# Patient Record
Sex: FEMALE | Race: WHITE | NOT HISPANIC OR LATINO | Employment: FULL TIME | URBAN - METROPOLITAN AREA
[De-identification: names, ages, dates, MRNs, and addresses within clinical notes are randomized per-mention and may not be internally consistent; named-entity substitution may affect disease eponyms.]

---

## 2023-10-27 ENCOUNTER — OFFICE VISIT (OUTPATIENT)
Dept: PRIMARY CARE | Facility: CLINIC | Age: 33
End: 2023-10-27
Payer: COMMERCIAL

## 2023-10-27 VITALS
WEIGHT: 143 LBS | BODY MASS INDEX: 21.74 KG/M2 | SYSTOLIC BLOOD PRESSURE: 115 MMHG | DIASTOLIC BLOOD PRESSURE: 82 MMHG | OXYGEN SATURATION: 97 % | HEART RATE: 95 BPM

## 2023-10-27 DIAGNOSIS — R55 NEAR SYNCOPE: Primary | ICD-10-CM

## 2023-10-27 PROBLEM — H69.93 DYSFUNCTION OF BOTH EUSTACHIAN TUBES: Status: ACTIVE | Noted: 2023-10-27

## 2023-10-27 PROBLEM — M77.8 TENDONITIS OF WRIST, LEFT: Status: ACTIVE | Noted: 2023-10-27

## 2023-10-27 PROBLEM — R41.840 ATTENTION AND CONCENTRATION DEFICIT: Status: ACTIVE | Noted: 2023-10-27

## 2023-10-27 PROCEDURE — 1036F TOBACCO NON-USER: CPT | Performed by: FAMILY MEDICINE

## 2023-10-27 PROCEDURE — 99214 OFFICE O/P EST MOD 30 MIN: CPT | Performed by: FAMILY MEDICINE

## 2023-10-27 RX ORDER — BISMUTH SUBSALICYLATE 262 MG
1 TABLET,CHEWABLE ORAL DAILY
COMMUNITY

## 2023-10-27 ASSESSMENT — ENCOUNTER SYMPTOMS
CONSTIPATION: 0
UNEXPECTED WEIGHT CHANGE: 0
FATIGUE: 0
NERVOUS/ANXIOUS: 1
DYSPHORIC MOOD: 0
SHORTNESS OF BREATH: 0
LIGHT-HEADEDNESS: 1
DIARRHEA: 0
DIZZINESS: 1
PALPITATIONS: 1
HEADACHES: 0
DIFFICULTY URINATING: 0
SLEEP DISTURBANCE: 0

## 2023-10-27 ASSESSMENT — PATIENT HEALTH QUESTIONNAIRE - PHQ9
2. FEELING DOWN, DEPRESSED OR HOPELESS: NOT AT ALL
SUM OF ALL RESPONSES TO PHQ9 QUESTIONS 1 AND 2: 0
1. LITTLE INTEREST OR PLEASURE IN DOING THINGS: NOT AT ALL

## 2023-10-27 NOTE — PROGRESS NOTES
Subjective   Patient ID: Cindy Chirinos is a 33 y.o. female who presents for Dizziness (Feeling faint last 2 months).  Dizziness  Pertinent negatives include no chest pain, fatigue or headaches.     Cindy presents for discussion of episodes of light-headedness/faint that she has noted over last few months.  Initially had headache, which improved with increase in fluids.  She does not feel this is triggered by anything, including cycles.  In between these episodes she feels fine.  Review of Systems   Constitutional:  Negative for fatigue and unexpected weight change.   Respiratory:  Negative for shortness of breath.    Cardiovascular:  Positive for palpitations. Negative for chest pain.   Gastrointestinal:  Negative for constipation and diarrhea.   Genitourinary:  Negative for difficulty urinating.   Neurological:  Positive for dizziness, syncope (near syncope) and light-headedness. Negative for headaches.   Psychiatric/Behavioral:  Negative for dysphoric mood and sleep disturbance. The patient is nervous/anxious (occasional).        Objective   Vitals:    10/27/23 1107   BP: 115/82   Pulse: 95   SpO2: 97%   Weight: 64.9 kg (143 lb)      Physical Exam  Constitutional:       Appearance: Normal appearance.   HENT:      Head: Normocephalic and atraumatic.   Eyes:      Extraocular Movements: Extraocular movements intact.      Pupils: Pupils are equal, round, and reactive to light.   Neck:      Thyroid: No thyroid mass or thyromegaly.   Cardiovascular:      Rate and Rhythm: Normal rate and regular rhythm.   Pulmonary:      Effort: Pulmonary effort is normal.      Breath sounds: Normal breath sounds.   Abdominal:      General: There is no distension.      Tenderness: There is no abdominal tenderness.   Musculoskeletal:      Cervical back: Normal range of motion and neck supple.   Lymphadenopathy:      Cervical: No cervical adenopathy.   Neurological:      General: No focal deficit present.      Mental Status: She is  alert and oriented to person, place, and time.      Cranial Nerves: Cranial nerves 2-12 are intact.   Psychiatric:         Mood and Affect: Mood normal.         Behavior: Behavior normal.         Assessment/Plan   There are no diagnoses linked to this encounter.    Problem List Items Addressed This Visit             ICD-10-CM    Near syncope - Primary R55     My main concern is potential arrhythmia.  Will refer to cardiology.  If no etiology found would look into metabolic or neurologic etiology         Relevant Orders    Referral to Cardiology

## 2023-10-27 NOTE — ASSESSMENT & PLAN NOTE
My main concern is potential arrhythmia.  Will refer to cardiology.  If no etiology found would look into metabolic or neurologic etiology

## 2023-11-07 RX ORDER — ALBUTEROL SULFATE 90 UG/1
1-2 AEROSOL, METERED RESPIRATORY (INHALATION) EVERY 4 HOURS PRN
COMMUNITY
Start: 2023-02-12 | End: 2023-11-08 | Stop reason: ALTCHOICE

## 2023-11-07 RX ORDER — PNV NO.95/FERROUS FUM/FOLIC AC 28MG-0.8MG
1 TABLET ORAL DAILY
COMMUNITY
Start: 2019-12-30 | End: 2023-11-08 | Stop reason: ALTCHOICE

## 2023-11-07 RX ORDER — BENZONATATE 100 MG/1
1 CAPSULE ORAL 3 TIMES DAILY PRN
COMMUNITY
Start: 2023-02-12 | End: 2023-11-08 | Stop reason: ALTCHOICE

## 2023-11-07 RX ORDER — CIPROFLOXACIN HYDROCHLORIDE 3 MG/ML
1 SOLUTION/ DROPS OPHTHALMIC 2 TIMES DAILY
COMMUNITY
Start: 2023-02-13 | End: 2023-11-08 | Stop reason: ALTCHOICE

## 2023-11-07 RX ORDER — DOCUSATE SODIUM 100 MG/1
1 CAPSULE, LIQUID FILLED ORAL 2 TIMES DAILY
COMMUNITY
End: 2023-11-08 | Stop reason: ALTCHOICE

## 2023-11-07 RX ORDER — FLUTICASONE PROPIONATE 50 MCG
2 SPRAY, SUSPENSION (ML) NASAL DAILY
COMMUNITY
Start: 2022-07-14 | End: 2023-11-08 | Stop reason: ALTCHOICE

## 2023-11-08 ENCOUNTER — OFFICE VISIT (OUTPATIENT)
Dept: CARDIOLOGY | Facility: CLINIC | Age: 33
End: 2023-11-08
Payer: COMMERCIAL

## 2023-11-08 VITALS
WEIGHT: 144 LBS | HEART RATE: 70 BPM | HEIGHT: 68 IN | SYSTOLIC BLOOD PRESSURE: 104 MMHG | BODY MASS INDEX: 21.82 KG/M2 | DIASTOLIC BLOOD PRESSURE: 74 MMHG

## 2023-11-08 DIAGNOSIS — R00.2 PALPITATIONS: ICD-10-CM

## 2023-11-08 DIAGNOSIS — R55 NEAR SYNCOPE: ICD-10-CM

## 2023-11-08 PROCEDURE — 1036F TOBACCO NON-USER: CPT | Performed by: INTERNAL MEDICINE

## 2023-11-08 PROCEDURE — 99204 OFFICE O/P NEW MOD 45 MIN: CPT | Performed by: INTERNAL MEDICINE

## 2023-11-08 PROCEDURE — 93000 ELECTROCARDIOGRAM COMPLETE: CPT | Performed by: INTERNAL MEDICINE

## 2023-11-08 NOTE — PROGRESS NOTES
"Referred by Dr. Hebert for Please see below.     History Of Present Illness:    Cindy Chirinos is a 33 y.o. woman presenting with near syncope, lightheadedness, and palpitations.    This 33 year old  is a patient of Dr. Hebert.  In August of 2023, she had episodes of lightheadedness and feeling faint.   These symptoms have occurred about once every three or four days.  On one occasion, about a month ago, she fell to the floor without loss of consciousness, associated with which she experienced palpitaitons.  uch episodes are not exertional.   She has never actually passed out or lost consciousness, but has had lightheaded episodes all her life.  She drinks about 120 oz of water per day, having deliberately increased her fluid intake noticing that she feels better when she does so.   She does 30 minutes of brisk walking every evening.  Three or four times a day she experiences palpitations, that feel like an \"extta thump\".   On one occasion, she recalls episodic irregular thumping of the heart that lasted about 60-90 minutes.  This episode occurred about 3-4 weeks ago.      She consumes 3-4 alcoholic beverages per week.  She has two coffees per day.  She denies a history of COLLEEN, and denies non-restorative sleep.  The patient denies use of weight loss supplements, body-building supplements, energy supplements, amphetamines, and cocaine.    Past Medical History:  She has a past medical history of Adolescent idiopathic scoliosis, site unspecified, Encounter for screening for malignant neoplasm of cervix, Exercise induced bronchospasm, and Personal history of other mental and behavioral disorders.    Past Surgical History:  She has a past surgical history that includes Other surgical history (09/30/2019); Other surgical history (09/30/2019); and Other surgical history (09/30/2019).      Social History:  She reports that she has never smoked. She has never used smokeless tobacco. She reports current alcohol " "use. She reports that she does not use drugs.    Family History:  Family History   Problem Relation Name Age of Onset    Hypertension Mother      Hypertension Father      Aortic aneurysm Paternal Grandfather          Allergies:  Patient has no known allergies.    Outpatient Medications:  Current Outpatient Medications   Medication Instructions    multivitamin tablet 1 tablet, oral, Daily        Last Recorded Vitals:  Vitals:    11/08/23 1520   BP: 104/74   BP Location: Left arm   Patient Position: Sitting   BP Cuff Size: Adult   Pulse: 70   Weight: 65.3 kg (144 lb)   Height: 1.727 m (5' 8\")       Physical Exam:  GENERAL:  pleasant 33 year-old  HEENT: No xanthelasma  NECK: Supple, no palpable adenopathy or thyromegaly  CHEST: Clear to auscultation, respiratory effort unlabored  CARDIAC: RRR, normal S1 and S2, no audible murmur, rub, gallop, carotids are brisk, PMI is not displaced  ABD: Active bowel sounds, nontender, no organomegaly, no evidence of ascites  EXT: No clubbing, cyanosis, edema, or tenderness  NEURO: Awake, alert, appropriate, speech is fluent    Last Labs:  CBC -  No results found for: \"WBC\", \"HGB\", \"HCT\", \"MCV\", \"PLT\"    CMP -  No results found for: \"CALCIUM\", \"PHOS\", \"PROT\", \"ALBUMIN\", \"AST\", \"ALT\", \"ALKPHOS\", \"BILITOT\"    LIPID PANEL -   No results found for: \"CHOL\", \"TRIG\", \"HDL\", \"CHHDL\", \"LDLF\", \"VLDL\", \"NHDL\"    RENAL FUNCTION PANEL -   No results found for: \"GLUCOSE\", \"NA\", \"K\", \"CL\", \"CO2\", \"ANIONGAP\", \"BUN\", \"CREATININE\", \"GFRMALE\", \"CALCIUM\", \"PHOS\", \"ALBUMIN\"     No results found for: \"BNP\", \"HGBA1C\"      No recent results to review    Assessment/Plan   Problem List Items Addressed This Visit          Symptoms and Signs    Near syncope     Other Visit Diagnoses       Palpitations              This 33 year old  has had a three month history of near syncope and palpitations as described.  Although she has had lightheaded episodes for much of her life, she has never actually " passed out.  Our approach:    Echocardiogram  Monitor study  CBC, TSH, BMP  Advised her to liberalize her intake of non-caffeinated, nonalcoholic beverages.      Chris Ramires MD

## 2023-11-08 NOTE — PATIENT INSTRUCTIONS
"It was my pleasure to meet you.  I look forward to being your cardiologist.  I am a huge believer in communicating with my patients.  Please contact me at any time, if anything is not clear to you regarding anything we have discussed, or if new questions occur to you.     You should increase your intake of fresh fruits and vegetables.  Try to consume 9-12 servings per day of such foods.  You should increase your intake of deep sea fish such as salmon and tuna.  Try to get two servings per week of fish, but if you are a pregnant woman, talk to your obstetrician before increasing your fish intake.  You should increase your intake of unprocessed nuts such as walnuts or almonds.  Increase your intake of plant-based protein.  You should avoid fried foods.  Don't consume sugary or starchy foods and sugary drinks.  Avoid saturated fats.  Try not to dine at restaurants more than once per month, and don't dine at fast food places.  Try to get 7-9 hours of sleep every night.  Try to get 150 minutes per week of moderate intensity exercise (after I have cleared you to start an exercise program).  Try to maintain the appropriate weight for your height based on body mass index (BMI). Maintain your cholesterol, blood sugar, and blood pressure in the recommended respective normal ranges.  There is a wealth of information on the American Heart Association's website regarding this.  Just Google \"Life's Essential 8\" for more information.   Ask me about any of these details  if you have questions.    As your cardiologist, I will be available to you at any time to answer any question you have concerning your heart health.  My staff, [facility] can also answer any questions you may have.  Best of luck.     It is important for us to have an accurate list of the medications, supplements, and their doses.  It is also important for us to have an accurate list of your allergies.  Please bring this information to every appointment.  " This is a vital part of the quality of care you receive through all of your providers.

## 2023-11-21 ENCOUNTER — HOSPITAL ENCOUNTER (OUTPATIENT)
Dept: CARDIOLOGY | Facility: HOSPITAL | Age: 33
Discharge: HOME | End: 2023-11-21
Payer: COMMERCIAL

## 2023-11-21 DIAGNOSIS — R00.2 PALPITATIONS: ICD-10-CM

## 2023-12-15 ENCOUNTER — OFFICE VISIT (OUTPATIENT)
Dept: PRIMARY CARE | Facility: CLINIC | Age: 33
End: 2023-12-15
Payer: COMMERCIAL

## 2023-12-15 VITALS
OXYGEN SATURATION: 100 % | HEART RATE: 81 BPM | WEIGHT: 148.8 LBS | DIASTOLIC BLOOD PRESSURE: 73 MMHG | SYSTOLIC BLOOD PRESSURE: 116 MMHG | BODY MASS INDEX: 22.62 KG/M2

## 2023-12-15 DIAGNOSIS — R55 NEAR SYNCOPE: ICD-10-CM

## 2023-12-15 DIAGNOSIS — Z00.00 WELL WOMAN EXAM WITHOUT GYNECOLOGICAL EXAM: Primary | ICD-10-CM

## 2023-12-15 DIAGNOSIS — F33.8 SEASONAL AFFECTIVE DISORDER (CMS-HCC): ICD-10-CM

## 2023-12-15 DIAGNOSIS — Z23 NEED FOR IMMUNIZATION AGAINST INFLUENZA: ICD-10-CM

## 2023-12-15 PROCEDURE — 90686 IIV4 VACC NO PRSV 0.5 ML IM: CPT | Performed by: FAMILY MEDICINE

## 2023-12-15 PROCEDURE — 90471 IMMUNIZATION ADMIN: CPT | Performed by: FAMILY MEDICINE

## 2023-12-15 PROCEDURE — 1036F TOBACCO NON-USER: CPT | Performed by: FAMILY MEDICINE

## 2023-12-15 PROCEDURE — 99395 PREV VISIT EST AGE 18-39: CPT | Performed by: FAMILY MEDICINE

## 2023-12-15 ASSESSMENT — PATIENT HEALTH QUESTIONNAIRE - PHQ9
SUM OF ALL RESPONSES TO PHQ9 QUESTIONS 1 AND 2: 0
2. FEELING DOWN, DEPRESSED OR HOPELESS: NOT AT ALL
1. LITTLE INTEREST OR PLEASURE IN DOING THINGS: NOT AT ALL

## 2023-12-15 ASSESSMENT — ENCOUNTER SYMPTOMS
DYSPHORIC MOOD: 1
PALPITATIONS: 0
FATIGUE: 0
SHORTNESS OF BREATH: 0
UNEXPECTED WEIGHT CHANGE: 0

## 2023-12-15 NOTE — ASSESSMENT & PLAN NOTE
Will await results of holter monitor.  If cost of echocardiogram is over $1800, I do not feel necessary to complete at this time, especially if increasing fluid intake is helping.

## 2024-01-15 ENCOUNTER — APPOINTMENT (OUTPATIENT)
Dept: CARDIOLOGY | Facility: HOSPITAL | Age: 34
End: 2024-01-15
Payer: COMMERCIAL

## 2024-01-19 ENCOUNTER — TELEMEDICINE (OUTPATIENT)
Dept: CARDIOLOGY | Facility: CLINIC | Age: 34
End: 2024-01-19
Payer: COMMERCIAL

## 2024-01-19 DIAGNOSIS — R00.2 PALPITATIONS: ICD-10-CM

## 2024-01-19 PROCEDURE — 99213 OFFICE O/P EST LOW 20 MIN: CPT | Performed by: INTERNAL MEDICINE

## 2024-01-19 NOTE — PATIENT INSTRUCTIONS
"You should increase your intake of fresh fruits and vegetables.  Try to consume 9-12 servings per day of such foods.  You should increase your intake of deep sea fish such as salmon and tuna.  Try to get two servings per week of fish, but if you are a pregnant woman, talk to your obstetrician before increasing your fish intake.  You should increase your intake of unprocessed nuts such as walnuts or almonds.  Increase your intake of plant-based protein.  You should avoid fried foods.  Don't consume sugary or starchy foods and sugary drinks.  Avoid saturated fats.  Try not to dine at restaurants more than once per month, and don't dine at fast food places.  Try to get 7-9 hours of sleep every night.  Try to get 150 minutes per week of moderate intensity exercise (after I have cleared you to start an exercise program).  Try to maintain the appropriate weight for your height based on body mass index (BMI). Maintain your cholesterol, blood sugar, and blood pressure in the recommended respective normal ranges.  There is a wealth of information on the American Heart Association's website regarding this.  Just Google \"Life's Essential 8\" for more information.   Ask me about any of these details  if you have questions.    As your cardiologist, I will be available to you at any time to answer any question you have concerning your heart health.  My staff, [facility] can also answer any questions you may have.  Best of luck.   "

## 2024-01-19 NOTE — PROGRESS NOTES
"Chief Complaint:   See Below (F/U 14 Day Mntr 11/21, Echo 1/15)     History Of Present Illness:    Cindy Chirinos is a 33 y.o. female presenting with near syncope, palpitations.    This 33 year old  has had a three month history of near syncope and palpitations as described in the previous note.  Although she has had lightheaded episodes for much of her life, she has never actually passed out.   Since her last visit, she has vastly increased her fluid intake, and feels much better.  She has not had any further symptoms of palpitations or near syncope as described in my previous note.  Her CBC, BMP, and thyroid function tests were normal (see labs scanned into EMR, November 2023).    Last Recorded Vitals:  There were no vitals filed for this visit.    Past Medical History:  She has a past medical history of Adolescent idiopathic scoliosis, site unspecified, Encounter for screening for malignant neoplasm of cervix, Exercise induced bronchospasm, and Personal history of other mental and behavioral disorders.    Past Surgical History:  She has a past surgical history that includes Other surgical history (09/30/2019); Other surgical history (09/30/2019); and Other surgical history (09/30/2019).      Social History:  She reports that she has never smoked. She has never used smokeless tobacco. She reports current alcohol use. She reports that she does not use drugs.    Family History:  Family History   Problem Relation Name Age of Onset    Hypertension Mother      Hypertension Father      Aortic aneurysm Paternal Grandfather          Allergies:  Patient has no known allergies.    Outpatient Medications:  Current Outpatient Medications   Medication Instructions    multivitamin tablet 1 tablet, oral, Daily       Physical Exam:  Since this was a virtual visit, no exam was performed.     Last Labs:  CBC -  No results found for: \"WBC\", \"HGB\", \"HCT\", \"MCV\", \"PLT\"    CMP -  No results found for: \"CALCIUM\", \"PHOS\", " "\"PROT\", \"ALBUMIN\", \"AST\", \"ALT\", \"ALKPHOS\", \"BILITOT\"    LIPID PANEL -   No results found for: \"CHOL\", \"TRIG\", \"HDL\", \"CHHDL\", \"LDLF\", \"VLDL\", \"NHDL\"    RENAL FUNCTION PANEL -   No results found for: \"GLUCOSE\", \"NA\", \"K\", \"CL\", \"CO2\", \"ANIONGAP\", \"BUN\", \"CREATININE\", \"GFRMALE\", \"CALCIUM\", \"PHOS\", \"ALBUMIN\"     No results found for: \"BNP\", \"HGBA1C\"      Lab review: I have Chemistry BMP No results found for: \"GLUCOSE\", \"CALCIUM\", \"CO2\", \"CREATININE\" and CBC:No results found for: \"WBC\", \"RBC\", \"HGB\", \"HCT\", \"MCV\", \"MCH\", \"MCHC\", \"RDW\", \"RDWCV\", \"MPV\", \"NRBC\"    Assessment/Plan   Problem List Items Addressed This Visit    None  Visit Diagnoses       Palpitations              Reassurance  Patient eduction: hydration, electrolyte replacement drinks.  Follow up prn    I spent 20 minutes with chart review, note construction, and interviewing the patient today.    Chris Ramires MD  "

## 2024-02-20 ENCOUNTER — PATIENT MESSAGE (OUTPATIENT)
Dept: PRIMARY CARE | Facility: CLINIC | Age: 34
End: 2024-02-20
Payer: COMMERCIAL

## 2024-02-20 DIAGNOSIS — F33.8 SEASONAL AFFECTIVE DISORDER (CMS-HCC): Primary | ICD-10-CM

## 2024-02-20 RX ORDER — ESCITALOPRAM OXALATE 10 MG/1
10 TABLET ORAL DAILY
Qty: 30 TABLET | Refills: 1 | Status: SHIPPED | OUTPATIENT
Start: 2024-02-20 | End: 2024-03-11 | Stop reason: SDUPTHER

## 2024-03-06 ENCOUNTER — APPOINTMENT (OUTPATIENT)
Dept: CARDIOLOGY | Facility: HOSPITAL | Age: 34
End: 2024-03-06
Payer: COMMERCIAL

## 2024-03-11 ENCOUNTER — OFFICE VISIT (OUTPATIENT)
Dept: PRIMARY CARE | Facility: CLINIC | Age: 34
End: 2024-03-11
Payer: COMMERCIAL

## 2024-03-11 VITALS
HEART RATE: 79 BPM | OXYGEN SATURATION: 100 % | BODY MASS INDEX: 22.62 KG/M2 | SYSTOLIC BLOOD PRESSURE: 121 MMHG | DIASTOLIC BLOOD PRESSURE: 72 MMHG | WEIGHT: 148.8 LBS

## 2024-03-11 DIAGNOSIS — F33.8 SEASONAL AFFECTIVE DISORDER (CMS-HCC): ICD-10-CM

## 2024-03-11 DIAGNOSIS — J45.990 EXERCISE-INDUCED ASTHMA (HHS-HCC): Primary | ICD-10-CM

## 2024-03-11 PROCEDURE — 99214 OFFICE O/P EST MOD 30 MIN: CPT | Performed by: FAMILY MEDICINE

## 2024-03-11 PROCEDURE — 1036F TOBACCO NON-USER: CPT | Performed by: FAMILY MEDICINE

## 2024-03-11 RX ORDER — ESCITALOPRAM OXALATE 10 MG/1
10 TABLET ORAL DAILY
Qty: 90 TABLET | Refills: 0 | Status: SHIPPED | OUTPATIENT
Start: 2024-03-11 | End: 2024-06-09

## 2024-03-11 ASSESSMENT — ENCOUNTER SYMPTOMS
HEADACHES: 0
DYSPHORIC MOOD: 0
DECREASED CONCENTRATION: 1
SLEEP DISTURBANCE: 0
UNEXPECTED WEIGHT CHANGE: 0
FATIGUE: 0
SHORTNESS OF BREATH: 0
NERVOUS/ANXIOUS: 0
DIARRHEA: 0
CONSTIPATION: 0
ABDOMINAL PAIN: 0

## 2024-03-11 ASSESSMENT — PATIENT HEALTH QUESTIONNAIRE - PHQ9
1. LITTLE INTEREST OR PLEASURE IN DOING THINGS: NOT AT ALL
2. FEELING DOWN, DEPRESSED OR HOPELESS: NOT AT ALL
SUM OF ALL RESPONSES TO PHQ9 QUESTIONS 1 AND 2: 0

## 2024-03-11 NOTE — ASSESSMENT & PLAN NOTE
Overall has noted improvement with Lexapro.  Will continue same dose for 2 months, patient will continue to journal.  I am hopeful motivation will improve in that time, but if not will revisit medication at next visit

## 2024-03-11 NOTE — PROGRESS NOTES
"Subjective   Patient ID: Cindy Chirinos is a 33 y.o. female who presents for Menstrual Problem (PT is here due to change in menstrual cycle and fatigue).  HPI  Cindy presents for follow up visit.  She started Lexapro 10 mg 3 weeks ago.  On plus side she has noted significantly less anxiety and less period cramps (only has had one cycle since starting SSRI), but notes sleeping more and some reduction in motivation.  Review of Systems   Constitutional:  Negative for fatigue and unexpected weight change.   Respiratory:  Negative for shortness of breath.    Cardiovascular:  Negative for chest pain.   Gastrointestinal:  Negative for abdominal pain, constipation and diarrhea.   Genitourinary:  Negative for dyspareunia.   Neurological:  Negative for headaches.   Psychiatric/Behavioral:  Positive for decreased concentration. Negative for dysphoric mood and sleep disturbance. The patient is not nervous/anxious.         Mood is still classified as \"not normal\"       Objective   Vitals:    03/11/24 0847   BP: 121/72   Pulse: 79   SpO2: 100%   Weight: 67.5 kg (148 lb 12.8 oz)      Physical Exam  Constitutional:       Appearance: Normal appearance.   HENT:      Head: Normocephalic and atraumatic.   Eyes:      Extraocular Movements: Extraocular movements intact.      Pupils: Pupils are equal, round, and reactive to light.   Cardiovascular:      Rate and Rhythm: Normal rate and regular rhythm.   Pulmonary:      Effort: Pulmonary effort is normal.      Breath sounds: Normal breath sounds.   Abdominal:      General: There is no distension.      Tenderness: There is no abdominal tenderness.   Musculoskeletal:      Cervical back: Normal range of motion and neck supple.   Neurological:      General: No focal deficit present.      Mental Status: She is alert and oriented to person, place, and time.   Psychiatric:         Mood and Affect: Mood normal.         Behavior: Behavior normal.         Assessment/Plan   There are no diagnoses " linked to this encounter.    Problem List Items Addressed This Visit             ICD-10-CM    Seasonal affective disorder (CMS/[facility]) F33.8     Overall has noted improvement with Lexapro.  Will continue same dose for 2 months, patient will continue to journal.  I am hopeful motivation will improve in that time, but if not will revisit medication at next visit         Relevant Medications    escitalopram (Lexapro) 10 mg tablet    Other Relevant Orders    Follow Up In Primary Care - Established    Exercise-induced asthma - Primary J45.990     Currently stable.

## 2024-03-15 ENCOUNTER — APPOINTMENT (OUTPATIENT)
Dept: CARDIOLOGY | Facility: CLINIC | Age: 34
End: 2024-03-15
Payer: COMMERCIAL

## 2024-06-10 ENCOUNTER — APPOINTMENT (OUTPATIENT)
Dept: PRIMARY CARE | Facility: CLINIC | Age: 34
End: 2024-06-10
Payer: COMMERCIAL

## 2024-06-12 ENCOUNTER — APPOINTMENT (OUTPATIENT)
Dept: PRIMARY CARE | Facility: CLINIC | Age: 34
End: 2024-06-12
Payer: COMMERCIAL

## 2024-07-15 ENCOUNTER — TELEPHONE (OUTPATIENT)
Dept: PRIMARY CARE | Facility: CLINIC | Age: 34
End: 2024-07-15
Payer: COMMERCIAL

## 2024-07-15 NOTE — TELEPHONE ENCOUNTER
Request for medical records received via fax from [facility]. [place] [facility] [place] [phone]. Confirmation received